# Patient Record
Sex: FEMALE | URBAN - METROPOLITAN AREA
[De-identification: names, ages, dates, MRNs, and addresses within clinical notes are randomized per-mention and may not be internally consistent; named-entity substitution may affect disease eponyms.]

---

## 2021-08-11 ENCOUNTER — APPOINTMENT (RX ONLY)
Dept: URBAN - METROPOLITAN AREA CLINIC 23 | Facility: CLINIC | Age: 71
Setting detail: DERMATOLOGY
End: 2021-08-11

## 2021-08-11 DIAGNOSIS — L82.1 OTHER SEBORRHEIC KERATOSIS: ICD-10-CM

## 2021-08-11 DIAGNOSIS — L82.0 INFLAMED SEBORRHEIC KERATOSIS: ICD-10-CM

## 2021-08-11 DIAGNOSIS — L72.0 EPIDERMAL CYST: ICD-10-CM

## 2021-08-11 PROBLEM — D48.5 NEOPLASM OF UNCERTAIN BEHAVIOR OF SKIN: Status: ACTIVE | Noted: 2021-08-11

## 2021-08-11 PROCEDURE — ? LIQUID NITROGEN

## 2021-08-11 PROCEDURE — 17110 DESTRUCTION B9 LES UP TO 14: CPT

## 2021-08-11 PROCEDURE — ? COUNSELING

## 2021-08-11 PROCEDURE — ? BIOPSY BY SHAVE METHOD

## 2021-08-11 PROCEDURE — ? ADDITIONAL NOTES

## 2021-08-11 PROCEDURE — 99202 OFFICE O/P NEW SF 15 MIN: CPT | Mod: 25

## 2021-08-11 PROCEDURE — 11102 TANGNTL BX SKIN SINGLE LES: CPT | Mod: 59

## 2021-08-11 ASSESSMENT — LOCATION SIMPLE DESCRIPTION DERM
LOCATION SIMPLE: RIGHT CHEEK
LOCATION SIMPLE: LEFT CHEEK
LOCATION SIMPLE: NOSE
LOCATION SIMPLE: LEFT ANTERIOR NECK

## 2021-08-11 ASSESSMENT — LOCATION DETAILED DESCRIPTION DERM
LOCATION DETAILED: NASAL ROOT
LOCATION DETAILED: LEFT INFERIOR LATERAL NECK
LOCATION DETAILED: RIGHT SUPERIOR NASAL CHEEK
LOCATION DETAILED: LEFT MEDIAL MALAR CHEEK

## 2021-08-11 ASSESSMENT — LOCATION ZONE DERM
LOCATION ZONE: FACE
LOCATION ZONE: NECK
LOCATION ZONE: NOSE

## 2021-08-11 NOTE — PROCEDURE: LIQUID NITROGEN
Render Post-Care Instructions In Note?: yes
Medical Necessity Information: It is in your best interest to select a reason for this procedure from the list below. All of these items fulfill various CMS LCD requirements except the new and changing color options.
Number Of Freeze-Thaw Cycles: 2 freeze-thaw cycles
Render Note In Bullet Format When Appropriate: No
Medical Necessity Clause: This procedure was medically necessary because the lesions that were treated were:
Application Tool (Optional): Liquid Nitrogen Sprayer
Post-Care Instructions: I reviewed with the patient in detail post-care instructions. Patient is to wear sunprotection, and avoid picking at any of the treated lesions. Pt may apply Vaseline to crusted or scabbing areas.
Duration Of Freeze Thaw-Cycle (Seconds): 5
Consent: The patient's consent was obtained including but not limited to risks of crusting, scabbing, blistering, scarring, darker or lighter pigmentary change, recurrence, incomplete removal and infection.
Detail Level: Detailed

## 2021-08-11 NOTE — HPI: SKIN LESION
What Type Of Note Output Would You Prefer (Optional)?: Bullet Format
Has Your Skin Lesion Been Treated?: not been treated
Is This A New Presentation, Or A Follow-Up?: Skin Lesion
Additional History: Patient unsure if lesion is due to glasses.
Is This A New Presentation, Or A Follow-Up?: Skin Lesions

## 2023-06-20 ENCOUNTER — TELEPHONE (OUTPATIENT)
Dept: SCHEDULING | Facility: CLINIC | Age: 73
End: 2023-06-20

## 2023-06-20 NOTE — TELEPHONE ENCOUNTER
New Patient Appointment Request    Name of caller: Darline Vidal    Reason for Visit: sob    Insurance: [place] [employer]    Insurance ID #: [id number]    Recent Cardiac Test/Procedures: none    Referred by: Irving Jimenez Sr.,     Primary Care Physician: Irving Jimenez Sr., DO    Previous Cardiologist name and phone number: Dr. Demetris Goode contact number: [phone]    Additional notes/History: 7/26/23 @ [facility]

## 2023-06-22 NOTE — TELEPHONE ENCOUNTER
Dr Willson- New patient appt scheduled for 7-    New Patient Visit Questionnaire  Use the F2 key to move through the asterisks.  Reason for appointment? Ref by PCP for SOB    Who referred you: PCP    Name of primary care physician Dr Jimenez    Has the patient ever been seen by a cardiologist before?  NO             If YES, please obtain name and location of previous cardiologist.  Do you give us permission to obtain Medical records from the Providers listed? YES    Have you had any recent lab work performed? Unknown, called PCP for records      If yes, where was this performed?    Have you ever had any cardiac testing (echo, stress test, carotid or aortic ultrasounds, cardiac MRI, etc.) NO    Have you ever had a cardiac catheterization, angioplasty, stent or heart surgery? NO    Have you had any recent hospitalizations? NO    If the patient has had previous testing/hospitalization, please determine where and when this was performed.  If the patient has copies of these reports or discharge summaries, please instruct them bring records to the visit.     PATIENT INSTRUCTIONS   Please bring a list of all your medications with the name of the medication, the dosage and how frequently you take the medication.  If you do not have a list, please bring all of your medication bottles with you.

## 2023-07-25 RX ORDER — ESTRADIOL 0.1 MG/G
CREAM VAGINAL 3 TIMES WEEKLY
COMMUNITY
Start: 2023-04-26

## 2023-07-25 RX ORDER — PRAVASTATIN SODIUM 10 MG/1
10 TABLET ORAL DAILY
COMMUNITY
Start: 2023-05-10 | End: 2024-06-05 | Stop reason: SDUPTHER

## 2023-07-25 RX ORDER — LISINOPRIL 10 MG/1
10 TABLET ORAL DAILY
COMMUNITY
Start: 2023-06-02 | End: 2024-06-05 | Stop reason: SDUPTHER

## 2023-07-26 ENCOUNTER — OFFICE VISIT (OUTPATIENT)
Dept: CARDIOLOGY | Facility: CLINIC | Age: 73
End: 2023-07-26
Payer: COMMERCIAL

## 2023-07-26 VITALS
HEIGHT: 62 IN | SYSTOLIC BLOOD PRESSURE: 140 MMHG | OXYGEN SATURATION: 98 % | TEMPERATURE: 97.5 F | BODY MASS INDEX: 26.72 KG/M2 | WEIGHT: 145.2 LBS | DIASTOLIC BLOOD PRESSURE: 80 MMHG | HEART RATE: 78 BPM | RESPIRATION RATE: 16 BRPM

## 2023-07-26 DIAGNOSIS — E78.9 LIPID DISORDER: ICD-10-CM

## 2023-07-26 DIAGNOSIS — R06.02 SHORTNESS OF BREATH: ICD-10-CM

## 2023-07-26 DIAGNOSIS — I10 PRIMARY HYPERTENSION: ICD-10-CM

## 2023-07-26 PROCEDURE — 3008F BODY MASS INDEX DOCD: CPT | Performed by: INTERNAL MEDICINE

## 2023-07-26 PROCEDURE — 3077F SYST BP >= 140 MM HG: CPT | Performed by: INTERNAL MEDICINE

## 2023-07-26 PROCEDURE — 3079F DIAST BP 80-89 MM HG: CPT | Performed by: INTERNAL MEDICINE

## 2023-07-26 PROCEDURE — 93000 ELECTROCARDIOGRAM COMPLETE: CPT | Performed by: INTERNAL MEDICINE

## 2023-07-26 PROCEDURE — 99204 OFFICE O/P NEW MOD 45 MIN: CPT | Performed by: INTERNAL MEDICINE

## 2023-07-26 RX ORDER — PNV NO.95/FERROUS FUM/FOLIC AC 28MG-0.8MG
TABLET ORAL
COMMUNITY

## 2023-07-26 RX ORDER — CHOLECALCIFEROL (VITAMIN D3) 25 MCG
1000 TABLET ORAL 3 TIMES WEEKLY
COMMUNITY

## 2023-07-26 NOTE — LETTER
July 26, 2023     Irving Jimenez Sr., DO  [address] [facility] [address]    Patient: Darline Vidal  YOB: 1950  Date of Visit: 7/26/2023      Dear Dr. Jimenez:    Thank you for referring Darline Vidal to me for evaluation. Below are my notes for this consultation.    If you have questions, please do not hesitate to call me. I look forward to following your patient along with you.         Sincerely,        Dallas Willson MD        CC: No Recipients    Dallas Willson MD  7/26/2023  2:51 PM  Signed       Cardiology Outpatient Note    [facility] HEART [facility] Office  [address] [id number]     [facility]  [facility]  [facility] Level  [address] [place] [address]     TEL  [phone]  [facility].[employer]/[facility]     Darline Vidal is a 72 y.o. female patient with history of hypertension hypercholesterolemia.  She is here for evaluation of shortness of breath which she has noticed in the last 2 months.  She says it has gotten better now but does not know why.  No associated chest pain diaphoresis syncope coughing or fevers.  She noticed the shortness of breath mostly when walking up inclines but she did not stop and kept walking.  No accelerated family history of coronary artery disease but she says multiple members on her father side have some type of heart issue-no further details.    Patient without history of  congestive heart failure, stroke, sleep apnea, myocardial infarction, cardiac stenting, cardiac surgery, rheumatic heart disease, congenital heart disease, autoimmune disease, tuberculosis, cancer or cancer treatment, peripheral vascular disease, pulmonary embolus, atrial fibrillation or diabetes.  No history of preeclampsia with 1 pregnancy.  No premature menopause.    No history of smoking vaping alcohol or drug abuse.    Patient had a stress test she believes in 2016 and believes it was done because of her blood  pressure.    Patient is currently asymptomatic.                                                         [facility]     Medical History:  Past Medical History:   Diagnosis Date   • Abnormal ECG    • Cardiac murmur    • Hypertension    • Lipid disorder    • Osteoporosis        Surgical History:  Past Surgical History:   Procedure Laterality Date   • HYSTERECTOMY         Social History:  Social History     Tobacco Use   • Smoking status: Never   • Smokeless tobacco: Never   Vaping Use   • Vaping Use: Never used   Substance Use Topics   • Alcohol use: Yes   • Drug use: Never       Family History: She indicated that her biological mother is . She indicated that her biological father is .      Allergies:Patient has no known allergies.    Current Medications:    Outpatient Encounter Medications as of 2023:   •  calcium carbonate-vitamin D3 500 mg-10 mcg (400 unit) tablet, Calcium 500 With D  •  cholecalciferol, vitamin D3, 1,000 unit (25 mcg) tablet, Take 1,000 Units by mouth daily.  •  denosumab (PROLIA) 60 mg/mL syringe, Inject 60 mg under the skin.  •  estradioL (ESTRACE) 0.01 % (0.1 mg/gram) vaginal cream, 3 (three) times a week (Mon, Wed, Fri).  •  lisinopriL (PRINIVIL) 10 mg tablet, Take 10 mg by mouth daily.  •  multivitamin capsule, multivitamin  •  pravastatin (PRAVACHOL) 10 mg tablet, Take 10 mg by mouth daily.                                                          OBJECTIVE   ROS as in HPI   Constitution: Negative for chills and fever.   Eyes: Negative for blurred vision and visual disturbance.   Cardiovascular: Negative for chest pain, dyspnea on exertion, near-syncope, palpitations and syncope.   Respiratory: Negative for hemoptysis, shortness of breath and wheezing.    Hematologic/Lymphatic: Negative for bleeding problem.   Skin: Negative for rash. No new skin changes  Gastrointestinal: Negative for abdominal pain, diarrhea, hematochezia, melena, nausea and vomiting.   Genitourinary: Negative  "for dysuria and hematuria.   Neurological: Negative for headaches.            Vitals:    07/26/23 1259   BP: 140/80   BP Location: Left upper arm   Patient Position: Sitting   Pulse: 78   Resp: 16   Temp: 36.4 °C (97.5 °F)   TempSrc: Temporal   SpO2: 98%   Weight: 65.9 kg (145 lb 3.2 oz)   Height: 1.575 m (5' 2\")       BP Readings from Last 3 Encounters:   07/26/23 140/80     Wt Readings from Last 3 Encounters:   07/26/23 65.9 kg (145 lb 3.2 oz)       Physical Exam   Constitutional: Appears comfortable in no distress  HEENT:  Neck Supple.  No JVD No carotid bruits no cervical lymphadenopathy  Head: Normocephalic.   Eyes: Extraocular movements intact  Cardiovascular: Normal rate, regular rhythm no S3 gallop.  Exam reveals no friction rub.    Pulmonary/Chest: Effort normal and breath sounds normal. No wheezes.  Abdominal: Soft and nontender. Bowel sounds are normal.   Musculoskeletal: No lower extremity edema.   Neurological: Alert and oriented to person, place, and time.   Skin: Skin is warm and dry.   Psychiatric: Behavior is normal.            Objective   No results found for: CHOL  No results found for: HDL  No results found for: LDLCALC  No results found for: TRIG  No results found for: ALT, AST  No results found for: WBC, HGB, HCT, PLT, INR  No results found for: GLUCOSE, NA, K, CO2, CL, BUN, CREATININE  No results found for: HGBA1C  No results found for: TSH  No results found for: BNP  [unfilled]    Troponin I Results    No lab values to display.       No results found for: HSTROPONINI                                                       IMAGING             EKG 7/26/2023   Sinus  Rhythm 74bpm [id number]  WITHIN NORMAL LIMITS                                             ASSESSMENT AND PLAN   Ms. Vidal is a 72-year-old woman with the following issues:    Assessment/Plan    Shortness of breath  Presentation not suggestive of an acute coronary syndrome congestive heart failure or significant valvular disease.  Patient " has been scheduled for stress echocardiogram will be contacted regarding results.    Hypertension  Lisinopril 10 mg daily    Lipid disorder  Pravastatin 10 mg daily             Return if symptoms worsen or fail to improve.       Thank you for allowing me to participate in the care of this patient.  I hope this information is helpful.         Dallas Willson MD [facility]  7/26/2023  2:51 PM    This document was generated utilizing voice recognition technology. A reasonable attempt at proofreading has been made to minimize errors but please excuse any typographical errors which may be present. Please call with any questions.

## 2023-07-26 NOTE — ASSESSMENT & PLAN NOTE
Presentation not suggestive of an acute coronary syndrome congestive heart failure or significant valvular disease.  Patient has been scheduled for stress echocardiogram will be contacted regarding results.

## 2023-07-26 NOTE — PROGRESS NOTES
Cardiology Outpatient Note    [facility] HEART GROUP    [facility] Office  [address] [place] [address]     [facility]  [facility] Level  [address] [place] [address]     TEL  [phone]  [facility]     Darline Vidal is a 72 y.o. female patient with history of hypertension hypercholesterolemia.  She is here for evaluation of shortness of breath which she has noticed in the last 2 months.  She says it has gotten better now but does not know why.  No associated chest pain diaphoresis syncope coughing or fevers.  She noticed the shortness of breath mostly when walking up inclines but she did not stop and kept walking.  No accelerated family history of coronary artery disease but she says multiple members on her father side have some type of heart issue-no further details.    Patient without history of  congestive heart failure, stroke, sleep apnea, myocardial infarction, cardiac stenting, cardiac surgery, rheumatic heart disease, congenital heart disease, autoimmune disease, tuberculosis, cancer or cancer treatment, peripheral vascular disease, pulmonary embolus, atrial fibrillation or diabetes.  No history of preeclampsia with 1 pregnancy.  No premature menopause.    No history of smoking vaping alcohol or drug abuse.    Patient had a stress test she believes in 2016 and believes it was done because of her blood pressure.    Patient is currently asymptomatic.                                                         [facility]     Medical History:  Past Medical History:   Diagnosis Date   • Abnormal ECG    • Cardiac murmur    • Hypertension    • Lipid disorder    • Osteoporosis        Surgical History:  Past Surgical History:   Procedure Laterality Date   • HYSTERECTOMY         Social History:  Social History     Tobacco Use   • Smoking status: Never   • Smokeless tobacco: Never   Vaping Use   • Vaping Use: Never used   Substance Use Topics   •  "Alcohol use: Yes   • Drug use: Never       Family History: She indicated that her biological mother is . She indicated that her biological father is .      Allergies:Patient has no known allergies.    Current Medications:    Outpatient Encounter Medications as of 2023:   •  calcium carbonate-vitamin D3 500 mg-10 mcg (400 unit) tablet, Calcium 500 With D  •  cholecalciferol, vitamin D3, 1,000 unit (25 mcg) tablet, Take 1,000 Units by mouth daily.  •  denosumab (PROLIA) 60 mg/mL syringe, Inject 60 mg under the skin.  •  estradioL (ESTRACE) 0.01 % (0.1 mg/gram) vaginal cream, 3 (three) times a week (Mon, Wed, Fri).  •  lisinopriL (PRINIVIL) 10 mg tablet, Take 10 mg by mouth daily.  •  multivitamin capsule, multivitamin  •  pravastatin (PRAVACHOL) 10 mg tablet, Take 10 mg by mouth daily.                                                          OBJECTIVE   ROS as in HPI   Constitution: Negative for chills and fever.   Eyes: Negative for blurred vision and visual disturbance.   Cardiovascular: Negative for chest pain, dyspnea on exertion, near-syncope, palpitations and syncope.   Respiratory: Negative for hemoptysis, shortness of breath and wheezing.    Hematologic/Lymphatic: Negative for bleeding problem.   Skin: Negative for rash. No new skin changes  Gastrointestinal: Negative for abdominal pain, diarrhea, hematochezia, melena, nausea and vomiting.   Genitourinary: Negative for dysuria and hematuria.   Neurological: Negative for headaches.            Vitals:    23 1259   BP: 140/80   BP Location: Left upper arm   Patient Position: Sitting   Pulse: 78   Resp: 16   Temp: 36.4 °C (97.5 °F)   TempSrc: Temporal   SpO2: 98%   Weight: 65.9 kg (145 lb 3.2 oz)   Height: 1.575 m (5' 2\")       BP Readings from Last 3 Encounters:   23 140/80     Wt Readings from Last 3 Encounters:   23 65.9 kg (145 lb 3.2 oz)       Physical Exam   Constitutional: Appears comfortable in no distress  HEENT:  " Neck Supple.  No JVD No carotid bruits no cervical lymphadenopathy  Head: Normocephalic.   Eyes: Extraocular movements intact  Cardiovascular: Normal rate, regular rhythm no S3 gallop.  Exam reveals no friction rub.    Pulmonary/Chest: Effort normal and breath sounds normal. No wheezes.  Abdominal: Soft and nontender. Bowel sounds are normal.   Musculoskeletal: No lower extremity edema.   Neurological: Alert and oriented to person, place, and time.   Skin: Skin is warm and dry.   Psychiatric: Behavior is normal.            Objective   No results found for: CHOL  No results found for: HDL  No results found for: LDLCALC  No results found for: TRIG  No results found for: ALT, AST  No results found for: WBC, HGB, HCT, PLT, INR  No results found for: GLUCOSE, NA, K, CO2, CL, BUN, CREATININE  No results found for: HGBA1C  No results found for: TSH  No results found for: BNP  [unfilled]    Troponin I Results    No lab values to display.       No results found for: HSTROPONINI                                                       IMAGING             EKG 7/26/2023   Sinus  Rhythm 74bpm [id number]  WITHIN NORMAL LIMITS                                             ASSESSMENT AND PLAN   Ms. Vidal is a 72-year-old woman with the following issues:    Assessment/Plan    Shortness of breath  Presentation not suggestive of an acute coronary syndrome congestive heart failure or significant valvular disease.  Patient has been scheduled for stress echocardiogram will be contacted regarding results.    Hypertension  Lisinopril 10 mg daily    Lipid disorder  Pravastatin 10 mg daily              Return if symptoms worsen or fail to improve.       Thank you for allowing me to participate in the care of this patient.  I hope this information is helpful.         Dallas Willson MD [facility]  7/26/2023  2:51 PM    This document was generated utilizing voice recognition technology. A reasonable attempt at proofreading has been made to minimize errors  but please excuse any typographical errors which may be present. Please call with any questions.

## 2023-07-27 ENCOUNTER — TELEPHONE (OUTPATIENT)
Dept: SCHEDULING | Facility: CLINIC | Age: 73
End: 2023-07-27
Payer: COMMERCIAL

## 2023-08-02 ENCOUNTER — HOSPITAL ENCOUNTER (OUTPATIENT)
Dept: CARDIOLOGY | Facility: CLINIC | Age: 73
Discharge: HOME | End: 2023-08-02
Attending: INTERNAL MEDICINE
Payer: COMMERCIAL

## 2023-08-02 VITALS — WEIGHT: 145 LBS | HEIGHT: 62 IN | BODY MASS INDEX: 26.68 KG/M2

## 2023-08-02 DIAGNOSIS — R06.02 SHORTNESS OF BREATH: ICD-10-CM

## 2023-08-02 LAB
AORTIC ROOT ANNULUS: 3.5 CM
ASCENDING AORTA: 3.4 CM
AV PEAK GRADIENT: 10 MMHG
AV PEAK VELOCITY-S: 1.56 M/S
AV VALVE AREA: 1.91 CM2
BSA FOR ECHO PROCEDURE: 1.7 M2
DOP CALC LVOT STROKE VOLUME: 83.93 ML
E WAVE DECELERATION TIME: 169 MS
E/A RATIO: 0.7
E/E' RATIO: 9.8
E/LAT E' RATIO: 8
EDV (BP): 41.3 CM3
EF (A4C): 82.2 %
EF A2C: 80.4 %
EJECTION FRACTION: 80.8 %
EST RIGHT VENT SYSTOLIC PRESSURE BY TRICUSPID REGURGITATION JET: 28 MMHG
ESV (BP): 7.92 CM3
FRACTIONAL SHORTENING: 42.44 %
INTERVENTRICULAR SEPTUM: 1.29 CM
LA ESV (BP): 48.6 CM3
LA ESV INDEX (A2C): 24.94 CM3/M2
LA ESV INDEX (BP): 28.59 CM3/M2
LA/AORTA RATIO: 1.11
LAAS-AP2: 16.9 CM2
LAAS-AP4: 19.6 CM2
LAD 2D: 3.9 CM
LALD A4C: 5.33 CM
LALD A4C: 5.72 CM
LAV-S: 42.4 CM3
LEFT ATRIUM VOLUME INDEX: 30.65 CM3/M2
LEFT ATRIUM VOLUME: 52.1 CM3
LEFT INTERNAL DIMENSION IN SYSTOLE: 2.17 CM (ref 2.35–3.56)
LEFT VENTRICLE DIASTOLIC VOLUME INDEX: 30.24 CM3/M2
LEFT VENTRICLE DIASTOLIC VOLUME: 51.4 CM3
LEFT VENTRICLE SYSTOLIC VOLUME INDEX: 5.36 CM3/M2
LEFT VENTRICLE SYSTOLIC VOLUME: 9.12 CM3
LEFT VENTRICULAR INTERNAL DIMENSION IN DIASTOLE: 3.77 CM (ref 3.96–5.5)
LEFT VENTRICULAR POSTERIOR WALL IN END DIASTOLE: 1.08 CM (ref 0.52–0.96)
LV DIASTOLIC VOLUME: 33.2 CM3
LV ESV (APICAL 2 CHAMBER): 6.51 CM3
LVAD-AP2: 17.2 CM2
LVAD-AP4: 21 CM2
LVAS-AP2: 6.71 CM2
LVAS-AP4: 7.58 CM2
LVEDVI(A2C): 19.53 CM3/M2
LVEDVI(BP): 24.29 CM3/M2
LVESVI(A2C): 3.83 CM3/M2
LVESVI(BP): 4.66 CM3/M2
LVLD-AP2: 7.27 CM
LVLD-AP4: 7.21 CM
LVLS-AP2: 5.65 CM
LVLS-AP4: 5.28 CM
LVOT 2D: 1.8 CM
LVOT A: 2.54 CM2
LVOT MG: 5 MMHG
LVOT MV: 0.97 M/S
LVOT PEAK VELOCITY: 1.49 M/S
LVOT PG: 9 MMHG
LVOT STROKE VOLUME INDEX: 49.37 ML/M2
LVOT VTI: 33 CM
MLH CV ECHO AVA INDEX VELOCITY RATIO: 1.1
MLH CV ECHO RAP PEAK DOSE: 5 MMHG
MLH CV ECHO RVSP PEAK DOSE: 37 MMHG
MLH CV ECHO TR PG PEAK DOSE: 31.81 MMHG
MLH CV ECHO TR PV PEAK DOSE: 2.82 M/S
MV E'TISSUE VEL-LAT: 0.08 M/S
MV E'TISSUE VEL-MED: 0.07 M/S
MV PEAK A VEL: 0.99 M/S
MV PEAK E VEL: 0.67 M/S
POSTERIOR WALL: 1.08 CM
PV PEAK GRADIENT: 6 MMHG
PV PV: 1.23 M/S
RAP: 3 MMHG
RVOT VMAX: 0.98 M/S
RVOT VTI: 23 CM
SEPTAL TISSUE DOPPLER FREE WALL LATE DIA VELOCITY (APICAL 4 CHAMBER VIEW): 0.14 M/S
STRESS BASELINE BP: NORMAL MMHG
STRESS BASELINE HR: 75 BPM
STRESS PERCENT HR: 103 %
STRESS POST ESTIMATED WORKLOAD: 7 METS
STRESS POST EXERCISE DUR MIN: 4 MIN
STRESS POST EXERCISE DUR SEC: 1 SEC
STRESS POST PEAK BP: NORMAL MMHG
STRESS POST PEAK HR: 155 BPM
STRESS TARGET HR: 128 BPM
TR MAX PG: 24.6 MMHG
TRICUSPID VALVE PEAK REGURGITATION VELOCITY: 2.48 M/S
Z-SCORE OF LEFT VENTRICULAR DIMENSION IN END DIASTOLE: -2.14
Z-SCORE OF LEFT VENTRICULAR DIMENSION IN END SYSTOLE: -2.27
Z-SCORE OF LEFT VENTRICULAR POSTERIOR WALL IN END DIASTOLE: 2.24

## 2023-08-02 PROCEDURE — 93320 DOPPLER ECHO COMPLETE: CPT | Performed by: NURSE PRACTITIONER

## 2023-08-02 PROCEDURE — 93325 DOPPLER ECHO COLOR FLOW MAPG: CPT | Performed by: NURSE PRACTITIONER

## 2023-08-02 PROCEDURE — 93351 STRESS TTE COMPLETE: CPT | Performed by: NURSE PRACTITIONER

## 2023-08-02 NOTE — TELEPHONE ENCOUNTER
I called patient regarding stress echocardiogram results.  Patient stated she did not have the same type of shortness of breath as she originally complained of while exercising.  She had no other significant symptoms.  She says that her breathing has actually improved.    She appears to be deconditioned and she says she has been walking less because of the warm weather.  No other complaints-no further cardiac testing warranted at this time and she will follow-up with Dr. Sharma.  She will return to the office if new issues arise.  All questions answered.

## 2024-04-19 ENCOUNTER — TELEPHONE (OUTPATIENT)
Dept: PRIMARY CARE | Facility: CLINIC | Age: 74
End: 2024-04-19
Payer: COMMERCIAL

## 2024-06-05 ENCOUNTER — OFFICE VISIT (OUTPATIENT)
Dept: PRIMARY CARE | Facility: CLINIC | Age: 74
End: 2024-06-05
Payer: COMMERCIAL

## 2024-06-05 VITALS
OXYGEN SATURATION: 99 % | DIASTOLIC BLOOD PRESSURE: 82 MMHG | TEMPERATURE: 97.6 F | RESPIRATION RATE: 16 BRPM | SYSTOLIC BLOOD PRESSURE: 130 MMHG | WEIGHT: 138.2 LBS | HEART RATE: 72 BPM | HEIGHT: 62 IN | BODY MASS INDEX: 25.43 KG/M2

## 2024-06-05 DIAGNOSIS — E78.00 PURE HYPERCHOLESTEROLEMIA: ICD-10-CM

## 2024-06-05 DIAGNOSIS — Z12.11 ENCOUNTER FOR SCREENING FOR MALIGNANT NEOPLASM OF COLON: ICD-10-CM

## 2024-06-05 DIAGNOSIS — M81.0 AGE-RELATED OSTEOPOROSIS WITHOUT CURRENT PATHOLOGICAL FRACTURE: ICD-10-CM

## 2024-06-05 DIAGNOSIS — I10 PRIMARY HYPERTENSION: Primary | ICD-10-CM

## 2024-06-05 PROCEDURE — 3075F SYST BP GE 130 - 139MM HG: CPT | Performed by: INTERNAL MEDICINE

## 2024-06-05 PROCEDURE — 3008F BODY MASS INDEX DOCD: CPT | Performed by: INTERNAL MEDICINE

## 2024-06-05 PROCEDURE — 3079F DIAST BP 80-89 MM HG: CPT | Performed by: INTERNAL MEDICINE

## 2024-06-05 PROCEDURE — 99203 OFFICE O/P NEW LOW 30 MIN: CPT | Performed by: INTERNAL MEDICINE

## 2024-06-05 RX ORDER — LISINOPRIL 10 MG/1
10 TABLET ORAL DAILY
Qty: 90 TABLET | Refills: 3 | Status: SHIPPED | OUTPATIENT
Start: 2024-06-05

## 2024-06-05 RX ORDER — PRAVASTATIN SODIUM 10 MG/1
10 TABLET ORAL DAILY
Qty: 90 TABLET | Refills: 3 | Status: SHIPPED | OUTPATIENT
Start: 2024-06-05

## 2024-06-05 ASSESSMENT — PAIN SCALES - GENERAL: PAINLEVEL: 0-NO PAIN

## 2024-06-05 ASSESSMENT — PATIENT HEALTH QUESTIONNAIRE - PHQ9: SUM OF ALL RESPONSES TO PHQ9 QUESTIONS 1 & 2: 0

## 2024-06-05 NOTE — PROGRESS NOTES
"    Subjective      Patient ID: Darline Vidal is a 73 y.o. female.  1950      New patient visit: 73 y old former pt of Dr Irving Shah LOV end of 2023    Has not checked BP at home, has been feeling well  Family hx colorectal cancer and may need colonoscopy   [facility]        The following have been reviewed and updated as appropriate in this visit:   Tobacco  Allergies  Meds  Problems       Review of Systems   All other systems reviewed and are negative.      Objective     Vitals:    06/05/24 0843   BP: 138/87   BP Location: Right upper arm   Patient Position: Sitting   Pulse: 72   Resp: 16   Temp: 36.4 °C (97.6 °F)   TempSrc: Temporal   SpO2: 99%   Weight: 62.7 kg (138 lb 3.2 oz)   Height: 1.575 m (5' 2\")     Body mass index is 25.28 kg/m².    Physical Exam  Vitals reviewed.   Constitutional:       Appearance: Normal appearance. She is well-developed.   Cardiovascular:      Rate and Rhythm: Normal rate and regular rhythm.      Heart sounds: Normal heart sounds.   Pulmonary:      Effort: Pulmonary effort is normal.      Breath sounds: Normal breath sounds.   Musculoskeletal:         General: No swelling.   Neurological:      Mental Status: She is alert.         Assessment/Plan   Diagnoses and all orders for this visit:    Primary hypertension (Primary)  Controlled cont lisinopril refilled  Get recent labs    Age-related osteoporosis without current pathological fracture  Sees Rheum cont zulma and vit d cont Prolia    Pure hypercholesterolemia  Cont pravachol renewed     Encounter for screening for malignant neoplasm of colon  -     Ambulatory referral to Gastroenterology; Future          "

## 2024-07-17 LAB — HM COLONOSCOPY: NEGATIVE

## 2024-11-28 SDOH — ECONOMIC STABILITY: FOOD INSECURITY: WITHIN THE PAST 12 MONTHS, YOU WORRIED THAT YOUR FOOD WOULD RUN OUT BEFORE YOU GOT MONEY TO BUY MORE.: NEVER TRUE

## 2024-11-28 SDOH — ECONOMIC STABILITY: INCOME INSECURITY: IN THE LAST 12 MONTHS, WAS THERE A TIME WHEN YOU WERE NOT ABLE TO PAY THE MORTGAGE OR RENT ON TIME?: NO

## 2024-11-28 SDOH — ECONOMIC STABILITY: FOOD INSECURITY: WITHIN THE PAST 12 MONTHS, THE FOOD YOU BOUGHT JUST DIDN'T LAST AND YOU DIDN'T HAVE MONEY TO GET MORE.: NEVER TRUE

## 2024-11-28 SDOH — ECONOMIC STABILITY: TRANSPORTATION INSECURITY
IN THE PAST 12 MONTHS, HAS LACK OF TRANSPORTATION KEPT YOU FROM MEETINGS, WORK, OR FROM GETTING THINGS NEEDED FOR DAILY LIVING?: NO

## 2024-11-28 SDOH — ECONOMIC STABILITY: TRANSPORTATION INSECURITY
IN THE PAST 12 MONTHS, HAS THE LACK OF TRANSPORTATION KEPT YOU FROM MEDICAL APPOINTMENTS OR FROM GETTING MEDICATIONS?: NO

## 2024-11-28 ASSESSMENT — SOCIAL DETERMINANTS OF HEALTH (SDOH): IN THE PAST 12 MONTHS, HAS THE ELECTRIC, GAS, OIL, OR WATER COMPANY THREATENED TO SHUT OFF SERVICE IN YOUR HOME?: NO

## 2024-12-05 ENCOUNTER — OFFICE VISIT (OUTPATIENT)
Dept: PRIMARY CARE | Facility: CLINIC | Age: 74
End: 2024-12-05
Payer: COMMERCIAL

## 2024-12-05 VITALS
BODY MASS INDEX: 24.66 KG/M2 | TEMPERATURE: 98.7 F | DIASTOLIC BLOOD PRESSURE: 74 MMHG | SYSTOLIC BLOOD PRESSURE: 111 MMHG | WEIGHT: 134 LBS | HEIGHT: 62 IN | OXYGEN SATURATION: 99 % | HEART RATE: 77 BPM | RESPIRATION RATE: 15 BRPM

## 2024-12-05 DIAGNOSIS — I10 PRIMARY HYPERTENSION: ICD-10-CM

## 2024-12-05 DIAGNOSIS — Z00.00 MEDICARE ANNUAL WELLNESS VISIT, SUBSEQUENT: Primary | ICD-10-CM

## 2024-12-05 DIAGNOSIS — E78.00 PURE HYPERCHOLESTEROLEMIA: ICD-10-CM

## 2024-12-05 PROCEDURE — G0439 PPPS, SUBSEQ VISIT: HCPCS | Mod: 25 | Performed by: INTERNAL MEDICINE

## 2024-12-05 ASSESSMENT — PATIENT HEALTH QUESTIONNAIRE - PHQ9: SUM OF ALL RESPONSES TO PHQ9 QUESTIONS 1 & 2: 0

## 2024-12-05 ASSESSMENT — MINI COG: TOTAL SCORE: 4

## 2024-12-05 ASSESSMENT — PAIN SCALES - GENERAL: PAINLEVEL_OUTOF10: 0-NO PAIN

## 2024-12-05 NOTE — PATIENT INSTRUCTIONS
Get tdap vaccine at local pharmacy                        Your Personalized Prevention Plan Services (PPPS)    Preventive Services Checklist (Assumes Average Risk Unless Otherwise Noted):    Health Maintenance Topics with due status: Overdue       Topic Date Due    Colorectal Cancer Screening Never done    Hepatitis C Screening Never done    DTaP, Tdap, and Td Vaccines Never done    Zoster Vaccine Never done    Pneumococcal (65 years and older) Never done     Health Maintenance Topics with due status: Not Due       Topic Last Completion Date    DEXA Scan 03/24/2023    Breast Cancer Screening 11/05/2024    Medicare Annual Wellness Visit 12/05/2024    Depression Screening 12/05/2024    Falls Risk Screening 12/05/2024     Health Maintenance Topics with due status: Completed       Topic Last Completion Date    RSV Vaccine 11/16/2023    Influenza Vaccine 10/02/2024    COVID-19 Vaccine 10/16/2024     Health Maintenance Topics with due status: Aged Out       Topic Date Due    Meningococcal ACWY Aged Out    RSV <20 months Aged Out    HIB Vaccines Aged Out    Hepatitis B Vaccines Aged Out    IPV Vaccines Aged Out    HPV Vaccines Aged Out       You May Be Eligible for These Additional Preventive Services   (Assumes Average Risk Unless Otherwise Noted)  Diabetes Screening Any 1 risk factor: hypertension, dyslipidemia, obesity, high glucose; or Any 2 risk factors: >=64yo, overweight, family history diabetes (covered every 6 months)   Hepatitis C Screening Any 1 risk factor: 1) blood transfusion before 1992,   2) current or past injection drug use (annually for high risk; if born between [birth year]51-2069, see above for status).   Vaccine: Hepatitis B As necessary if at-risk: hemophilia, ESRD, diabetes, living with individual infected with hep B, healthcare worker with frequent contact with blood/bodily fluids (series covered once)   Sexually Transmitted Diseases (STDs) As necessary chlamydia, gonorrhea, syphilis, hepatitis B (covered  annually)  HIV if any 1 risk factor present: 1) <14yo or >64yo and at increased risk or 2) 15-64yo and ask for it (covered annually)   Lung Cancer Screening Low dose chest CT if all three risk factors: 1) 50-78yo, 2) smoker or quit within last 15y, 3) >=20 pack years (covered annually).  No results found for this or any previous visit.       Cholesterol Screening Both risk factors: 1) >=21yo and 2)  increased risk coronary artery disease (covered every 5 years).   Breast Cancer Screening Covered once 35-38yo, annually >=41yo (if >=49yo, see above for status).       Health Risk Factors with Personalized Education:  ----------------------------------------------------------------------------------------------------------------------  Controlling Your Blood Pressure  Maintain a normal weight (body mass index between 18.5 and 24.9).  Eat more fruit, vegetables and low-fat dairy.  Eat less saturated fat and total fat.  Lower your sodium (salt) intake.  Try to stay under 1500 mg per day, but if you cannot get your intake to be that low, at least lower it by 1000 mg.  Stay active.  Try to get at least 90 to 150 minutes of exercise per week.  Try brisk walking, swimming, bicycling or dancing.  Limit alcohol intake.  When you do consume alcohol, drink no more than 1 drink per day.  If you have been prescribed medication, take it regularly and exactly as prescribed.  Let your PCP know if you have any problems or questions about your medication.  Check your blood pressure at home or at the store.  Write down your readings and share them with your PCP  ----------------------------------------------------------------------------------------------------------------------  Controlling Your Cholesterol  Reduce the amount of saturated and trans fat in your diet.  Limit intake of red meat.  Consume only low-fat or non-fat/skim dairy.  Limit fried food.  Cook with vegetable oils.  Reduce your intake of sugary foods, sugary drinks and  alcohol.  Eat a diet high in fruit, vegetables and whole grains.  Get protein from fish, poultry and a small portion of nuts.  Stay active.  Try to get at least 90 to 150 minutes of exercise per week.  Try brisk walking, swimming, bicycling or dancing.  Maintain a healthy weight by balancing your diet and exercise.  If you have been prescribed medication, take it regularly and exactly as prescribed. Let your PCP know if you have any problems or questions about your medication.  It’s important to know your cholesterol numbers.  When recommended by your PCP, get the cholesterol blood test.  ---------------------------------------------------------------------------------------------------------------------   Controlling Your Osteoporosis, Strengthening Your Bones  Try to get at least 90 to 150 minutes of weight-bearing exercise per week.  Ensure intake of at least 1200mg of calcium per day.  Eat foods high in calcium like milk and other dairy, green vegetables, fruit, canned fish with soft and edible bones, nuts, calcium-set tofu.  Some foods are calcium-fortified, like bread, cereal, fruit juices and mineral water.  Help your body make vitamin D by getting 10-15 minutes per day of sunlight.    Ensure intake of at least 600IU of vitamin D per day.  Eat foods high in vitamin D like oily fish (salmon, sardines, mackerel) and eggs.  Some foods are fortified with vitamin D, like dairy and cereals.  Avoid high amounts of caffeine and salt, since they can cause the body to loose calcium.  Limit alcohol intake, since it is associated with weaker bones and is associated with falls and fractures.  Limit intake of fizzy drinks.  If you have been prescribed medication, take it regularly and exactly as prescribed.  Let your PCP know if you have any problems or questions about your medication  ----------------------------------------------------------------------------------------------------------------------  Reducing Your Risk  of Falls  Tell your PCP if any of your medications make you feel tired, dizzy, lightheaded or off-balance.  Maintain coordination, flexibility and balance by ensuring regular physical activity.  Limit alcohol intake to 1 drink per day.  Consider avoiding all alcohol intake.  Ensure good vision.  Visit an ophthalmologist or optometrist regularly for vision screening or to make sure your glasses / contact lens prescription is correct.  If you need glasses or contacts, wear them.  When you get new glasses or contacts, take time to get used to them.  Do not wear sunglasses or tinted lenses when indoors.  Ensure good hearing.  Have your hearing checked if you are having trouble hearing, or family and friends think you cannot hear them.  If you need a hearing aid, be sure it fits well and wear it.  Get enough rest.  Ensure about 7-9 hours of sleep every day.  Get up slowly from your bed or chairs.  Do not start walking until you are sure you feel steady.  Wear non-skid, rubber-soled, low-heeled shoes.  Do not walk in socks, or in shoes and slippers with smooth soles.  If your PCP or therapist recommends using a cane or walker, use it regularly.  Make your home safer.  Increase lighting throughout the house, especially at the top and bottom of stairs.  Ensure lighting is easily turned on when getting up in the middle of the night.  Make sure there are two secure rails on all stairs.  Install grab bars in the bathtub / shower and near the toilet.  Consider using a shower chair and / or a hand-held shower.  Spread sand or salt on icy surfaces.  Beware of wet surfaces, which can be icy.  Tell your PCP if you have fallen.

## 2024-12-05 NOTE — PROGRESS NOTES
"Subjective      Patient ID: Darline Vidal is a 74 y.o. female.  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      Objective     Vitals:    12/05/24 0939   BP: 111/74   BP Location: Left upper arm   Patient Position: Sitting   Pulse: 77   Resp: 15   Temp: 37.1 °C (98.7 °F)   TempSrc: Temporal   SpO2: 99%   Weight: 60.8 kg (134 lb)   Height: 1.575 m (5' 2\")     Body mass index is 24.51 kg/m².    Physical Exam  Vitals reviewed.   Constitutional:       Appearance: Normal appearance. She is well-developed.   HENT:      Head: Normocephalic and atraumatic.      Right Ear: There is impacted cerumen.      Left Ear: Tympanic membrane, ear canal and external ear normal.      Mouth/Throat:      Mouth: Mucous membranes are moist.      Pharynx: Oropharynx is clear.   Eyes:      Conjunctiva/sclera: Conjunctivae normal.   Cardiovascular:      Rate and Rhythm: Normal rate and regular rhythm.      Heart sounds: Normal heart sounds.   Pulmonary:      Effort: Pulmonary effort is normal.      Breath sounds: Normal breath sounds.   Abdominal:      General: Abdomen is flat. Bowel sounds are normal.      Palpations: Abdomen is soft.   Musculoskeletal:         General: No swelling.      Cervical back: Normal range of motion and neck supple.   Skin:     General: Skin is warm and dry.   Neurological:      General: No focal deficit present.      Mental Status: She is alert and oriented to person, place, and time.   Psychiatric:         Mood and Affect: Mood normal.         Behavior: Behavior " normal.         Thought Content: Thought content normal.         Judgment: Judgment normal.         Assessment & Plan  Medicare annual wellness visit, subsequent    Orders:    CBC; Future    Comprehensive metabolic panel; Future    Lipid panel; Future    Urinalysis with microscopic; Future    Hepatitis C antibody; Future    Pure hypercholesterolemia    Orders:    Comprehensive metabolic panel; Future    Lipid panel; Future  Cont Pravachol    Primary hypertension    Orders:    Comprehensive metabolic panel; Future    Urinalysis with microscopic; Future    Cont Prinivil controlled     Subjective     Darline Vidal is a 74 y.o. female who presents for a subsequent annual wellness visit.     Patient Care Team:  Rena Scott DO as PCP - General (Internal Medicine)    Comprehensive Medical and Social History  Patient Active Problem List   Diagnosis    Hypertension    Osteoporosis    Pure hypercholesterolemia     Past Medical History:   Diagnosis Date    Abnormal ECG     Cardiac murmur     Hypertension     Lipid disorder     Osteoporosis 6/5/2024     Past Surgical History   Procedure Laterality Date    Hysterectomy      Left cervix, ovaries removed per pt     No Known Allergies  Current Outpatient Medications   Medication Sig Dispense Refill    calcium carbonate-vitamin D3 500 mg-10 mcg (400 unit) tablet Calcium 500 With D      cholecalciferol, vitamin D3, 1,000 unit (25 mcg) tablet Take 1,000 Units by mouth 3 (three) times a week (Mon, Wed, Fri).      denosumab (PROLIA) 60 mg/mL syringe Inject 60 mg under the skin.      estradioL (ESTRACE) 0.01 % (0.1 mg/gram) vaginal cream 3 (three) times a week (Mon, Wed, Fri).      lisinopriL (PRINIVIL) 10 mg tablet Take 1 tablet (10 mg total) by mouth daily. 90 tablet 3    multivitamin capsule multivitamin      pravastatin (PRAVACHOL) 10 mg tablet Take 1 tablet (10 mg total) by mouth daily. 90 tablet 3    vit C/E/Zn/coppr/lutein/zeaxan (PRESERVISION AREDS-2 ORAL) Take by mouth.   "     No current facility-administered medications for this visit.     Social History     Tobacco Use    Smoking status: Never    Smokeless tobacco: Never   Vaping Use    Vaping status: Never Used   Substance Use Topics    Alcohol use: Yes     Comment: occasionally    Drug use: Never     Family History   Problem Relation Name Age of Onset    Colon cancer Biological Mother Adrienne     Heart disease Biological Father Ken        Objective   Vitals  Vitals:    12/05/24 0939   BP: 111/74   BP Location: Left upper arm   Patient Position: Sitting   Pulse: 77   Resp: 15   Temp: 37.1 °C (98.7 °F)   TempSrc: Temporal   SpO2: 99%   Weight: 60.8 kg (134 lb)  Comment: with sneakers on   Height: 1.575 m (5' 2\")   PainSc: 0-No pain     Body mass index is 24.51 kg/m².    Advanced Care Plan  Does patient have advance directive?: Yes       Patient has Advance Directive: Advance Directive is NOT in chart, requested to bring in                             PHQ  Will the patient answer the depression questions?: Yes   Little interest or pleasure in doing things: Not at all   Feeling down, depressed, or hopeless: Not at all   Depression Risk: 0                                             Mini Cog  Score: 4  Result: Negative      Get Up and Go  Result: Pass    STEADI Falls Risk  One or more falls in the last year: No           Has trouble stepping up onto a curb: No   Advised to use a cane or walker to get around safely: No   Often has to rush to the toilet: No   Feels unsteady when walking: No   Has lost some feeling in feet: No   Often feels sad or depressed: No   Steadies self on furniture while walking at home: No   Takes medication that makes him/her feel lightheaded or more tired than usual: No   Worried about falling: No   Takes medicine to sleep or improve mood: No   Needs to push with hands when rising from a chair: No   Falls screen completed: Yes     Hearing and Vision Screening  No results found.  See HRA for relevant " hearing screening response.    Diet and Exercise   Low fat           Assessment/Plan   Diagnoses and all orders for this visit:    Medicare annual wellness visit, subsequent (Primary)  -     CBC; Future  -     Comprehensive metabolic panel; Future  -     Lipid panel; Future  -     Urinalysis with microscopic; Future  -     Hepatitis C antibody; Future    Pure hypercholesterolemia  -     Comprehensive metabolic panel; Future  -     Lipid panel; Future    Primary hypertension  -     Comprehensive metabolic panel; Future  -     Urinalysis with microscopic; Future        See Patient Instructions (the written plan) which was given to the patient for PPPS and health risk factors with interventions.

## 2024-12-13 PROBLEM — K63.5 INTESTINAL POLYP: Status: ACTIVE | Noted: 2024-12-13

## 2024-12-13 PROBLEM — K57.90 DIVERTICULAR DISEASE: Status: ACTIVE | Noted: 2024-12-13

## 2024-12-28 LAB
ALBUMIN SERPL-MCNC: 4.3 G/DL (ref 3.8–4.8)
ALP SERPL-CCNC: 57 IU/L (ref 44–121)
ALT SERPL-CCNC: 13 IU/L (ref 0–32)
APPEARANCE UR: CLEAR
AST SERPL-CCNC: 21 IU/L (ref 0–40)
BACTERIA #/AREA URNS HPF: NORMAL /[HPF]
BILIRUB SERPL-MCNC: 0.6 MG/DL (ref 0–1.2)
BILIRUB UR QL STRIP: NEGATIVE
BUN SERPL-MCNC: 15 MG/DL (ref 8–27)
BUN/CREAT SERPL: 20 (ref 12–28)
CALCIUM SERPL-MCNC: 9.4 MG/DL (ref 8.7–10.3)
CASTS URNS QL MICRO: NORMAL /LPF
CHLORIDE SERPL-SCNC: 102 MMOL/L (ref 96–106)
CHOLEST SERPL-MCNC: 189 MG/DL (ref 100–199)
CO2 SERPL-SCNC: 24 MMOL/L (ref 20–29)
COLOR UR: YELLOW
CREAT SERPL-MCNC: 0.75 MG/DL (ref 0.57–1)
EGFRCR SERPLBLD CKD-EPI 2021: 85 ML/MIN/1.73
EPI CELLS #/AREA URNS HPF: NORMAL /HPF (ref 0–10)
ERYTHROCYTE [DISTWIDTH] IN BLOOD BY AUTOMATED COUNT: 11.9 % (ref 11.7–15.4)
GLOBULIN SER CALC-MCNC: 2 G/DL (ref 1.5–4.5)
GLUCOSE SERPL-MCNC: 96 MG/DL (ref 70–99)
GLUCOSE UR QL STRIP: NEGATIVE
HCT VFR BLD AUTO: 42.6 % (ref 34–46.6)
HCV IGG SERPL QL IA: NON REACTIVE
HDLC SERPL-MCNC: 70 MG/DL
HGB BLD-MCNC: 14.1 G/DL (ref 11.1–15.9)
HGB UR QL STRIP: NEGATIVE
KETONES UR QL STRIP: NEGATIVE
LDLC SERPL CALC-MCNC: 95 MG/DL (ref 0–99)
LEUKOCYTE ESTERASE UR QL STRIP: ABNORMAL
MCH RBC QN AUTO: 31.8 PG (ref 26.6–33)
MCHC RBC AUTO-ENTMCNC: 33.1 G/DL (ref 31.5–35.7)
MCV RBC AUTO: 96 FL (ref 79–97)
MICRO URNS: ABNORMAL
NITRITE UR QL STRIP: NEGATIVE
PH UR STRIP: 6.5 [PH] (ref 5–7.5)
PLATELET # BLD AUTO: 317 X10E3/UL (ref 150–450)
POTASSIUM SERPL-SCNC: 4.4 MMOL/L (ref 3.5–5.2)
PROT SERPL-MCNC: 6.3 G/DL (ref 6–8.5)
PROT UR QL STRIP: NEGATIVE
RBC # BLD AUTO: 4.44 X10E6/UL (ref 3.77–5.28)
RBC #/AREA URNS HPF: NORMAL /HPF (ref 0–2)
SODIUM SERPL-SCNC: 141 MMOL/L (ref 134–144)
SP GR UR STRIP: 1 (ref 1–1.03)
TRIGL SERPL-MCNC: 139 MG/DL (ref 0–149)
UROBILINOGEN UR STRIP-MCNC: 0.2 MG/DL (ref 0.2–1)
VLDLC SERPL CALC-MCNC: 24 MG/DL (ref 5–40)
WBC # BLD AUTO: 5.9 X10E3/UL (ref 3.4–10.8)
WBC #/AREA URNS HPF: NORMAL /HPF (ref 0–5)

## 2025-05-29 ENCOUNTER — OFFICE VISIT (OUTPATIENT)
Dept: PRIMARY CARE | Facility: CLINIC | Age: 75
End: 2025-05-29
Payer: COMMERCIAL

## 2025-05-29 VITALS
BODY MASS INDEX: 25.17 KG/M2 | OXYGEN SATURATION: 99 % | WEIGHT: 136.8 LBS | TEMPERATURE: 97.3 F | HEART RATE: 73 BPM | HEIGHT: 62 IN | SYSTOLIC BLOOD PRESSURE: 110 MMHG | RESPIRATION RATE: 16 BRPM | DIASTOLIC BLOOD PRESSURE: 68 MMHG

## 2025-05-29 DIAGNOSIS — Z12.31 BREAST CANCER SCREENING BY MAMMOGRAM: ICD-10-CM

## 2025-05-29 DIAGNOSIS — I10 PRIMARY HYPERTENSION: Primary | ICD-10-CM

## 2025-05-29 DIAGNOSIS — E78.00 PURE HYPERCHOLESTEROLEMIA: ICD-10-CM

## 2025-05-29 DIAGNOSIS — Z00.00 HEALTHCARE MAINTENANCE: ICD-10-CM

## 2025-05-29 DIAGNOSIS — M81.0 AGE-RELATED OSTEOPOROSIS WITHOUT CURRENT PATHOLOGICAL FRACTURE: ICD-10-CM

## 2025-05-29 PROCEDURE — 3074F SYST BP LT 130 MM HG: CPT | Performed by: STUDENT IN AN ORGANIZED HEALTH CARE EDUCATION/TRAINING PROGRAM

## 2025-05-29 PROCEDURE — 99214 OFFICE O/P EST MOD 30 MIN: CPT | Performed by: STUDENT IN AN ORGANIZED HEALTH CARE EDUCATION/TRAINING PROGRAM

## 2025-05-29 PROCEDURE — G2211 COMPLEX E/M VISIT ADD ON: HCPCS | Performed by: STUDENT IN AN ORGANIZED HEALTH CARE EDUCATION/TRAINING PROGRAM

## 2025-05-29 PROCEDURE — 3078F DIAST BP <80 MM HG: CPT | Performed by: STUDENT IN AN ORGANIZED HEALTH CARE EDUCATION/TRAINING PROGRAM

## 2025-05-29 PROCEDURE — 3008F BODY MASS INDEX DOCD: CPT | Performed by: STUDENT IN AN ORGANIZED HEALTH CARE EDUCATION/TRAINING PROGRAM

## 2025-05-29 RX ORDER — PRAVASTATIN SODIUM 10 MG/1
10 TABLET ORAL DAILY
Qty: 90 TABLET | Refills: 3 | Status: SHIPPED | OUTPATIENT
Start: 2025-05-29

## 2025-05-29 RX ORDER — LISINOPRIL 10 MG/1
10 TABLET ORAL DAILY
Qty: 90 TABLET | Refills: 3 | Status: SHIPPED | OUTPATIENT
Start: 2025-05-29

## 2025-08-06 LAB
ALBUMIN SERPL-MCNC: 4.4 G/DL (ref 3.8–4.8)
ALP SERPL-CCNC: 49 IU/L (ref 44–121)
ALT SERPL-CCNC: 14 IU/L (ref 0–32)
AST SERPL-CCNC: 22 IU/L (ref 0–40)
BASOPHILS # BLD AUTO: 0 X10E3/UL (ref 0–0.2)
BASOPHILS NFR BLD AUTO: 1 %
BILIRUB SERPL-MCNC: 0.7 MG/DL (ref 0–1.2)
BUN SERPL-MCNC: 13 MG/DL (ref 8–27)
BUN/CREAT SERPL: 16 (ref 12–28)
CALCIUM SERPL-MCNC: 9.4 MG/DL (ref 8.7–10.3)
CHLORIDE SERPL-SCNC: 105 MMOL/L (ref 96–106)
CHOLEST SERPL-MCNC: 187 MG/DL (ref 100–199)
CO2 SERPL-SCNC: 23 MMOL/L (ref 20–29)
CREAT SERPL-MCNC: 0.8 MG/DL (ref 0.57–1)
EGFRCR SERPLBLD CKD-EPI 2021: 78 ML/MIN/1.73
EOSINOPHIL # BLD AUTO: 0.1 X10E3/UL (ref 0–0.4)
EOSINOPHIL NFR BLD AUTO: 2 %
ERYTHROCYTE [DISTWIDTH] IN BLOOD BY AUTOMATED COUNT: 12.2 % (ref 11.7–15.4)
GLOBULIN SER CALC-MCNC: 2 G/DL (ref 1.5–4.5)
GLUCOSE SERPL-MCNC: 99 MG/DL (ref 70–99)
HCT VFR BLD AUTO: 43.2 % (ref 34–46.6)
HDLC SERPL-MCNC: 62 MG/DL
HGB BLD-MCNC: 13.9 G/DL (ref 11.1–15.9)
IMM GRANULOCYTES # BLD AUTO: 0 X10E3/UL (ref 0–0.1)
IMM GRANULOCYTES NFR BLD AUTO: 0 %
LDLC SERPL CALC-MCNC: 101 MG/DL (ref 0–99)
LYMPHOCYTES # BLD AUTO: 1.3 X10E3/UL (ref 0.7–3.1)
LYMPHOCYTES NFR BLD AUTO: 28 %
MCH RBC QN AUTO: 31.4 PG (ref 26.6–33)
MCHC RBC AUTO-ENTMCNC: 32.2 G/DL (ref 31.5–35.7)
MCV RBC AUTO: 98 FL (ref 79–97)
MONOCYTES # BLD AUTO: 0.2 X10E3/UL (ref 0.1–0.9)
MONOCYTES NFR BLD AUTO: 5 %
NEUTROPHILS # BLD AUTO: 2.9 X10E3/UL (ref 1.4–7)
NEUTROPHILS NFR BLD AUTO: 64 %
PLATELET # BLD AUTO: 282 X10E3/UL (ref 150–450)
POTASSIUM SERPL-SCNC: 4.9 MMOL/L (ref 3.5–5.2)
PROT SERPL-MCNC: 6.4 G/DL (ref 6–8.5)
RBC # BLD AUTO: 4.43 X10E6/UL (ref 3.77–5.28)
SODIUM SERPL-SCNC: 143 MMOL/L (ref 134–144)
TRIGL SERPL-MCNC: 136 MG/DL (ref 0–149)
VLDLC SERPL CALC-MCNC: 24 MG/DL (ref 5–40)
WBC # BLD AUTO: 4.5 X10E3/UL (ref 3.4–10.8)